# Patient Record
Sex: MALE | Race: WHITE | NOT HISPANIC OR LATINO | URBAN - METROPOLITAN AREA
[De-identification: names, ages, dates, MRNs, and addresses within clinical notes are randomized per-mention and may not be internally consistent; named-entity substitution may affect disease eponyms.]

---

## 2017-04-12 NOTE — PATIENT DISCUSSION
Recommended Avastin (prophy) injection. The injection was given and tolerated well by patient. Post-injection instructions were reviewed and understood by the patient.

## 2017-04-12 NOTE — PROCEDURE NOTE: CLINICAL
PROCEDURE NOTE: Avastin () (prophy) OS. Diagnosis: Neovascular AMD with Inactive CNV. Prep: Betadine Flush. Prior to the original injection, risks/benefits/alternatives discussed including infection, loss of vision, hemorrhage, cataract, glaucoma, retinal tears or detachment. A written consent is on file, and the need for today’s injection was discussed and the patient is understanding and wishes to proceed. The off-label status of Intravitreal Avastin also was reviewed. The patient wished to proceed with treatment. The patient wished to proceed with treatment. Topical anesthetic drops were applied to the eye. Betadine prep was performed. Surgical mask worn. Sterile drape and lid speculum were applied. Using the syringe provided, Avastin 1.25 mg in 0.05 cc was injected into the vitreous cavity. Injection site: 3-4 mm from the limbus. Patient tolerated procedure well. Following the intravitreal injection, the sterile lid speculum was removed. CRA perfusion confirmed. CF vision checked. Patient given office phone number/answering service number and advised to call immediately should there be an increase in floaters or redness, loss of vision or pain, or should they have any other questions or concerns. Tammie Mariano

## 2017-06-05 NOTE — PATIENT DISCUSSION
Advised to call immediately if any worsening distortion or blurring is noted. Continue with Macular Shield AREDS 2.

## 2017-06-05 NOTE — PATIENT DISCUSSION
No treatment at this time due to no chance of increase VA potential due to damage from AMD. Observation recommended.

## 2017-06-05 NOTE — PATIENT DISCUSSION
Pt edu no change from last visit. [facility] Prophy Avastin # 12 in 1 month due to high tendency to return. Pt elects to proceed.

## 2017-07-05 NOTE — PATIENT DISCUSSION
Pt edu no change from last visit. [facility] Prophy Avastin # 12 (prophy) due to high tendency to return. Pt elects to proceed.

## 2017-07-05 NOTE — PROCEDURE NOTE: CLINICAL
PROCEDURE NOTE: Avastin () (prophy) OS. Diagnosis: Neovascular AMD with Active CNV. Prep: Betadine Flush. Prior to the original injection, risks/benefits/alternatives discussed including infection, loss of vision, hemorrhage, cataract, glaucoma, retinal tears or detachment. A written consent is on file, and the need for today’s injection was discussed and the patient is understanding and wishes to proceed. The off-label status of Intravitreal Avastin also was reviewed. The patient wished to proceed with treatment. The patient wished to proceed with treatment. Topical anesthetic drops were applied to the eye. Betadine prep was performed. Surgical mask worn. Sterile drape and lid speculum were applied. Using the syringe provided, Avastin 1.25 mg in 0.05 cc was injected into the vitreous cavity. Injection site: 3-4 mm from the limbus. Patient tolerated procedure well. Following the intravitreal injection, the sterile lid speculum was removed. CRA perfusion confirmed. CF vision checked. Patient given office phone number/answering service number and advised to call immediately should there be an increase in floaters or redness, loss of vision or pain, or should they have any other questions or concerns. Alana Hassan

## 2017-08-28 NOTE — PATIENT DISCUSSION
Pt edu new blood, but no treatment recc due to no increase in [place] potential. Will watch for now.

## 2017-08-28 NOTE — PATIENT DISCUSSION
Pt edu no change from last visit. [facility] Prophy Avastin # 13 (prophy) due to high tendency to return. Pt elects to proceed.

## 2017-09-27 NOTE — PROCEDURE NOTE: CLINICAL
PROCEDURE NOTE: Avastin () Prophy #13 OS. Diagnosis: Neovascular AMD with Inactive CNV. Risks and benefits of the procedure were explained in detail to the patient including loss of vision, loss of eye, infection, bleeding, retinal detachment, glaucoma, need for additional procedures, and cataract. Additionally. The patient was informed that AVASTIN is not approved for intraocular use and is being administered off label. Alternatives including other AntiNegativeFG agents were discussed with the patient. The patient was given the opportunity to ask any questions, and address any concerns. Patient understood the risks and benefits, and signed written consent for surgery. Tetravisc Given *. Topical anesthesia was induced with Alcaine. 4% lidocaine pledge. Betadine prep was performed. Product is within expiration date, and has been verified. An unopened 30 g needle was securely affixed to the 1 cc syringe. Excess drug and air bubbles were carefully expressed such that the plunger aligned with the .05 mL viviana on the syringe. A lid speculum was used. After the Betadine prep, intravitreal injection of Avastin 1.25mg/0.05 ml was given. Injection site: 3-4 mm from the limbus. The needle was withdrawn; retinal perfusion was verified. Lid speculum removed. The eye was irrigated with sterile irrigating solution. The betadine was washed away. Patient tolerated procedure well. Count fingers vision was verified. There were no complications. Patient given office phone number/answering service phone number. Post-injection instructions were reviewed and understood. Symptoms of RD and endophthalmitis following intravitreal injection were discussed. Patient advised to call right away if any loss of vision, new floaters, or eye pain. Post-op instructions given. Patient was given the standard instruction sheet. Appropriate follow-up was arranged. Derrick Adame

## 2017-09-27 NOTE — PATIENT DISCUSSION
Recommended injection of Avastin # 13 as prophy. The injection was given and tolerated well by patient. Post-injection instructions were reviewed and understood by the patient.

## 2017-10-27 NOTE — PATIENT DISCUSSION
Fluid still present on exam today. No injection recommended. No visual potential increase due to advanced stage AMD/permanent pigment damage.

## 2017-11-01 NOTE — PROCEDURE NOTE: CLINICAL
PROCEDURE NOTE: Avastin ()(1 of 2) #14 OS. Diagnosis: Neovascular AMD with Inactive CNV. Prior to the original injection, risks/benefits/alternatives discussed including infection, loss of vision, hemorrhage, cataract, glaucoma, retinal tears or detachment. A written consent is on file, and the need for today’s injection was discussed and the patient is understanding and wishes to proceed. The off-label status of Intravitreal Avastin also was reviewed. The patient wished to proceed with treatment. The patient wished to proceed with treatment. Topical anesthetic drops were applied to the eye. Betadine prep was performed. Surgical mask worn. Sterile drape and lid speculum were applied. Using the syringe provided, Avastin 1.25 mg in 0.05 cc was injected into the vitreous cavity. Injection site: 3-4 mm from the limbus. Patient tolerated procedure well. Following the intravitreal injection, the sterile lid speculum was removed. CRA perfusion confirmed. CF vision checked. Patient given office phone number/answering service number and advised to call immediately should there be an increase in floaters or redness, loss of vision or pain, or should they have any other questions or concerns. Miriam Cortes

## 2017-11-29 NOTE — PATIENT DISCUSSION
Injection was administered without complication.  Post-injection instructions reviewed and understood by patient.

## 2017-11-29 NOTE — PROCEDURE NOTE: CLINICAL
PROCEDURE NOTE: Avastin () (2 of 2) #15 OS. Diagnosis: Neovascular AMD with Active CNV. Prep: Betadine Drops and Betadine Scrub. Prior to the original injection, risks/benefits/alternatives discussed including infection, loss of vision, hemorrhage, cataract, glaucoma, retinal tears or detachment. A written consent is on file, and the need for today’s injection was discussed and the patient is understanding and wishes to proceed. The off-label status of Intravitreal Avastin also was reviewed. The patient wished to proceed with treatment. The patient wished to proceed with treatment. Topical anesthetic drops were applied to the eye. Betadine prep was performed. Surgical mask worn. Sterile drape and lid speculum were applied. Using the syringe provided, Avastin 1.25 mg in 0.05 cc was injected into the vitreous cavity. Injection site: 3-4 mm from the limbus. Patient tolerated procedure well. Following the intravitreal injection, the sterile lid speculum was removed. CRA perfusion confirmed. CF vision checked. Patient given office phone number/answering service number and advised to call immediately should there be an increase in floaters or redness, loss of vision or pain, or should they have any other questions or concerns. Jay Roland

## 2018-01-11 NOTE — PATIENT DISCUSSION
No fluid today. 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 Explained to patient. Pt is not driving.

## 2018-01-11 NOTE — PATIENT DISCUSSION
Based on the OCT findings, I recommend a “treat and extend” protocol, and we will extend the injection interval to 7 weeks.

## 2018-01-24 NOTE — PROCEDURE NOTE: CLINICAL
PROCEDURE NOTE: Avastin () #16 OS. Diagnosis: Neovascular AMD with Active CNV. Prep: Betadine Drops and Betadine Scrub. Prior to the original injection, risks/benefits/alternatives discussed including infection, loss of vision, hemorrhage, cataract, glaucoma, retinal tears or detachment. A written consent is on file, and the need for today’s injection was discussed and the patient is understanding and wishes to proceed. The off-label status of Intravitreal Avastin also was reviewed. The patient wished to proceed with treatment. The patient wished to proceed with treatment. Topical anesthetic drops were applied to the eye. Betadine prep was performed. Surgical mask worn. Sterile drape and lid speculum were applied. Using the syringe provided, Avastin 1.25 mg in 0.05 cc was injected into the vitreous cavity. Injection site: 3-4 mm from the limbus. Patient tolerated procedure well. Following the intravitreal injection, the sterile lid speculum was removed. CRA perfusion confirmed. CF vision checked. Patient given office phone number/answering service number and advised to call immediately should there be an increase in floaters or redness, loss of vision or pain, or should they have any other questions or concerns. Ori Abarca

## 2018-03-16 NOTE — PATIENT DISCUSSION
Pt edu inactive now. Due to high tendency to return and chronic condition, recommend Avastin # 17 in 2 weeks. Advised pt to call with any vision changes.

## 2018-03-29 NOTE — PROCEDURE NOTE: CLINICAL
PROCEDURE NOTE: Avastin () #17 OS. Diagnosis: Neovascular AMD with Inactive CNV. Prior to the original injection, risks/benefits/alternatives discussed including infection, loss of vision, hemorrhage, cataract, glaucoma, retinal tears or detachment. A written consent is on file, and the need for today’s injection was discussed and the patient is understanding and wishes to proceed. The off-label status of Intravitreal Avastin also was reviewed. The patient wished to proceed with treatment. The patient wished to proceed with treatment. Topical anesthetic drops were applied to the eye. Betadine prep was performed. Surgical mask worn. Sterile drape and lid speculum were applied. Using the syringe provided, Avastin 1.25 mg in 0.05 cc was injected into the vitreous cavity. Injection site: 3-4 mm from the limbus. Patient tolerated procedure well. Following the intravitreal injection, the sterile lid speculum was removed. CRA perfusion confirmed. CF vision checked. Patient given office phone number/answering service number and advised to call immediately should there be an increase in floaters or redness, loss of vision or pain, or should they have any other questions or concerns. Esther Stephens

## 2018-05-30 NOTE — PROCEDURE NOTE: CLINICAL
PROCEDURE NOTE: Avastin () (1 of 2) #18 OS. Diagnosis: Neovascular AMD with Inactive CNV. Prep: Betadine Drops and Betadine Scrub. Prior to the original injection, risks/benefits/alternatives discussed including infection, loss of vision, hemorrhage, cataract, glaucoma, retinal tears or detachment. A written consent is on file, and the need for today’s injection was discussed and the patient is understanding and wishes to proceed. The off-label status of Intravitreal Avastin also was reviewed. The patient wished to proceed with treatment. The patient wished to proceed with treatment. Topical anesthetic drops were applied to the eye. Betadine prep was performed. Surgical mask worn. Sterile drape and lid speculum were applied. Using the syringe provided, Avastin 1.25 mg in 0.05 cc was injected into the vitreous cavity. Injection site: 3-4 mm from the limbus. Patient tolerated procedure well. Following the intravitreal injection, the sterile lid speculum was removed. CRA perfusion confirmed. CF vision checked. Patient given office phone number/answering service number and advised to call immediately should there be an increase in floaters or redness, loss of vision or pain, or should they have any other questions or concerns. Rema Land

## 2018-05-30 NOTE — PATIENT DISCUSSION
Pt edu still inactive but due to high tendency to return and only good seeing eye Recommend Avastin # 18 today. Pt elects to proceed. Goal is to treat and extend. Reason for decreased vision is due to atrophy which unfortunately is not treatable.

## 2018-05-30 NOTE — PATIENT DISCUSSION
Recommended injection of Avastin # 18 (1 of 2). The injection was given and tolerated well by patient. Post-injection instructions were reviewed and understood by the patient.

## 2018-07-25 NOTE — PATIENT DISCUSSION
Pt here today for Avastin # 19 (2 of 2). The injection was given and tolerated well by patient. Post-injection instructions were reviewed and understood by the patient.

## 2018-07-25 NOTE — PROCEDURE NOTE: CLINICAL
PROCEDURE NOTE: Avastin () ( 2 of 2) #19 OS. Diagnosis: Neovascular AMD with Inactive CNV. Prep: Betadine Drops and Betadine Scrub. Prior to the original injection, risks/benefits/alternatives discussed including infection, loss of vision, hemorrhage, cataract, glaucoma, retinal tears or detachment. A written consent is on file, and the need for today’s injection was discussed and the patient is understanding and wishes to proceed. The off-label status of Intravitreal Avastin also was reviewed. The patient wished to proceed with treatment. The patient wished to proceed with treatment. Topical anesthetic drops were applied to the eye. Betadine prep was performed. Surgical mask worn. Sterile drape and lid speculum were applied. Using the syringe provided, Avastin 1.25 mg in 0.05 cc was injected into the vitreous cavity. Injection site: 3-4 mm from the limbus. Patient tolerated procedure well. Following the intravitreal injection, the sterile lid speculum was removed. CRA perfusion confirmed. CF vision checked. Patient given office phone number/answering service number and advised to call immediately should there be an increase in floaters or redness, loss of vision or pain, or should they have any other questions or concerns. Carlos Schaefer

## 2018-10-10 NOTE — PATIENT DISCUSSION
Pt here today for Avastin #20 injection.  The injection was administered without complication.  Post-injection instructions were reviewed and understood by pt.

## 2018-10-10 NOTE — PROCEDURE NOTE: CLINICAL
PROCEDURE NOTE: Avastin () #20 OS. Diagnosis: Neovascular AMD with Inactive CNV. Prep: Betadine Drops and Betadine Scrub. Prior to the original injection, risks/benefits/alternatives discussed including infection, loss of vision, hemorrhage, cataract, glaucoma, retinal tears or detachment. A written consent is on file, and the need for today’s injection was discussed and the patient is understanding and wishes to proceed. The off-label status of Intravitreal Avastin also was reviewed. The patient wished to proceed with treatment. The patient wished to proceed with treatment. Topical anesthetic drops were applied to the eye. Betadine prep was performed. Surgical mask worn. Sterile drape and lid speculum were applied. Using the syringe provided, Avastin 1.25 mg in 0.05 cc was injected into the vitreous cavity. Injection site: 3-4 mm from the limbus. Patient tolerated procedure well. Following the intravitreal injection, the sterile lid speculum was removed. CRA perfusion confirmed. CF vision checked. Patient given office phone number/answering service number and advised to call immediately should there be an increase in floaters or redness, loss of vision or pain, or should they have any other questions or concerns. Carlos Schaefer
100% of the time

## 2018-11-16 NOTE — PATIENT DISCUSSION
Pt edu reason for decreased VA is due to atrophy which unfortunately is not treatable. Pt edu still inactive. Due to high tendency to return and only good seeing eye, would recommend Avastin #21 in 4 weeks. Pt elects to proceed.

## 2019-01-02 NOTE — PROCEDURE NOTE: CLINICAL
PROCEDURE NOTE: Avastin () #21 OS. Diagnosis: Neovascular AMD with Inactive CNV. Prep: Betadine Drops and Betadine Scrub. Prior to the original injection, risks/benefits/alternatives discussed including infection, loss of vision, hemorrhage, cataract, glaucoma, retinal tears or detachment. A written consent is on file, and the need for today’s injection was discussed and the patient is understanding and wishes to proceed. The off-label status of Intravitreal Avastin also was reviewed. The patient wished to proceed with treatment. The patient wished to proceed with treatment. Topical anesthetic drops were applied to the eye. Betadine prep was performed. Surgical mask worn. Sterile drape and lid speculum were applied. Using the syringe provided, Avastin 1.25 mg in 0.05 cc was injected into the vitreous cavity. Injection site: 3-4 mm from the limbus. Patient tolerated procedure well. Following the intravitreal injection, the sterile lid speculum was removed. CRA perfusion confirmed. CF vision checked. Patient given office phone number/answering service number and advised to call immediately should there be an increase in floaters or redness, loss of vision or pain, or should they have any other questions or concerns. Elaine Leong

## 2019-03-15 NOTE — PATIENT DISCUSSION
Pt edu no treatment recommend at this time.  No increase in vision potential due to damage from advanced AMD.

## 2019-03-15 NOTE — PATIENT DISCUSSION
Pt edu remaining stable. Due to high tendency to return and only good seeing eye would recommend Avastin # 22 in 3 weeks. Pt elects to proceed.

## 2019-04-03 NOTE — PROCEDURE NOTE: CLINICAL
PROCEDURE NOTE: Avastin () #22 OS. Diagnosis: Neovascular AMD with Inactive CNV. Prep: Betadine Drops and Betadine Scrub. Prior to the original injection, risks/benefits/alternatives discussed including infection, loss of vision, hemorrhage, cataract, glaucoma, retinal tears or detachment. A written consent is on file, and the need for today’s injection was discussed and the patient is understanding and wishes to proceed. The off-label status of Intravitreal Avastin also was reviewed. The patient wished to proceed with treatment. The patient wished to proceed with treatment. Topical anesthetic drops were applied to the eye. Betadine prep was performed. Surgical mask worn. Sterile drape and lid speculum were applied. Using the syringe provided, Avastin 1.25 mg in 0.05 cc was injected into the vitreous cavity. Injection site: 3-4 mm from the limbus. Patient tolerated procedure well. Following the intravitreal injection, the sterile lid speculum was removed. CRA perfusion confirmed. CF vision checked. Patient given office phone number/answering service number and advised to call immediately should there be an increase in floaters or redness, loss of vision or pain, or should they have any other questions or concerns. Rosmery Granados

## 2019-06-21 NOTE — PATIENT DISCUSSION
New Periph hem seen and IR fluid, advanced with discaform scar and central atrophy so no visual increase potential.

## 2019-06-21 NOTE — PATIENT DISCUSSION
Goal of tx edu, rec series of 2 Avastin x 8 weeks apart.  High tendency for activity to return and this is a chronic condition.

## 2019-07-03 NOTE — PATIENT DISCUSSION
Pt here for Avastin #23(1 of 2) today.  Post inj instructions. Due to decreased [place] OCT was performed today which showed an increase in swelling. Recommend decreasing interval between injections. Recommend 2 of 2 in 4 weeks. Pt elects to proceed.

## 2019-07-03 NOTE — PROCEDURE NOTE: CLINICAL
PROCEDURE NOTE: Avastin () ( 1 of 2) #23 OS. Diagnosis: Neovascular AMD with Inactive CNV. Prep: Betadine Drops and Betadine Scrub. Prior to the original injection, risks/benefits/alternatives discussed including infection, loss of vision, hemorrhage, cataract, glaucoma, retinal tears or detachment. A written consent is on file, and the need for today’s injection was discussed and the patient is understanding and wishes to proceed. The off-label status of Intravitreal Avastin also was reviewed. The patient wished to proceed with treatment. The patient wished to proceed with treatment. Topical anesthetic drops were applied to the eye. Betadine prep was performed. Surgical mask worn. Sterile drape and lid speculum were applied. Using the syringe provided, Avastin 1.25 mg in 0.05 cc was injected into the vitreous cavity. Injection site: 3-4 mm from the limbus. Patient tolerated procedure well. Following the intravitreal injection, the sterile lid speculum was removed. CRA perfusion confirmed. CF vision checked. Patient given office phone number/answering service number and advised to call immediately should there be an increase in floaters or redness, loss of vision or pain, or should they have any other questions or concerns. Rosmery Granados

## 2019-07-31 NOTE — PROCEDURE NOTE: CLINICAL
PROCEDURE NOTE: Avastin () (2 of 2) #24 OS. Diagnosis: Neovascular AMD with Inactive CNV. Prep: Betadine Drops and Betadine Scrub. Prior to the original injection, risks/benefits/alternatives discussed including infection, loss of vision, hemorrhage, cataract, glaucoma, retinal tears or detachment. A written consent is on file, and the need for today’s injection was discussed and the patient is understanding and wishes to proceed. The off-label status of Intravitreal Avastin also was reviewed. The patient wished to proceed with treatment. The patient wished to proceed with treatment. Topical anesthetic drops were applied to the eye. Betadine prep was performed. Surgical mask worn. Sterile drape and lid speculum were applied. Using the syringe provided, Avastin 1.25 mg in 0.05 cc was injected into the vitreous cavity. Injection site: 3-4 mm from the limbus. Patient tolerated procedure well. Following the intravitreal injection, the sterile lid speculum was removed. CRA perfusion confirmed. CF vision checked. Patient given office phone number/answering service number and advised to call immediately should there be an increase in floaters or redness, loss of vision or pain, or should they have any other questions or concerns. GFT#[id number] exp 09/12/2019.

## 2019-09-06 NOTE — PATIENT DISCUSSION
No fluid seen today.  Recommended  switch to Eylea to extend treatments.  Eylea #1 SAMPLE, then OCT in 5 weeks.  Will fill out Eylea 4U form next visit.

## 2019-09-06 NOTE — PATIENT DISCUSSION
New Periph hem seen and IR fluid, advanced with disciform scar and central atrophy so no visual increase potential.

## 2019-09-11 NOTE — PROCEDURE NOTE: CLINICAL
PROCEDURE NOTE: Eylea Sample #1 OS. Diagnosis: Neovascular AMD with Inactive CNV. Anesthesia: Topical. Prep: Betadine Drops and Betadine Scrub. Prior to injection, risks/benefits/alternatives discussed including corneal abrasion, infection, loss of vision, hemorrhage, cataract, glaucoma, retinal tears or detachment. A written consent is on file, and the need for today’s injection was discussed and the patient is understanding and wishes to proceed. The entire vial of Eylea was drawn up into a syringe. The opened vial, remaining drug, and filtered needle were disposed of in a certified biohazard container. Betadine prep was performed. Topical anesthesia was induced with Alcaine. 4% lidocaine pledge. A lid speculum was used. A short 30g needle on a 1cc syringe was used with product that that had previously been prepared under sterile conditions. Injection site: 3-4 mm from the limbus. The used syringe/needle was transferred to a biohazard container. Lid speculum removed. Mask worn during procedure. Patient tolerated procedure well. Count fingers vision was verified. There were no complications. Patient was given the standard instruction sheet. Patient given office phone number/answering service number and advised to call immediately should there be loss of vision or pain, or should they have any other questions or concerns. [facility]#[id number] exp 12/2019.

## 2019-10-25 NOTE — PATIENT DISCUSSION
Good response to Eylea.  Recommended Eylea in x 2 weeks.  Series of 2 x 6 weeks apart.   Will have pt sign up for Eylea 4U.

## 2019-10-25 NOTE — PATIENT DISCUSSION
Stable Periph heme and IR fluid, advanced with disciform scar and central atrophy so no visual increase potential.

## 2019-10-25 NOTE — PATIENT DISCUSSION
Good reponds to Eylea.  Recommended Eylea in x 2 weeks.  Series of 2 x 6 weeks apart.   Will have pt sign up for Eylea 4U.

## 2019-11-13 NOTE — PATIENT DISCUSSION
Pt here today for Eylea #2 (1 of 2) injection.  The injection was administered without complication. Post-injection instructions were reviewed and understood by the patient.

## 2019-11-13 NOTE — PROCEDURE NOTE: CLINICAL
PROCEDURE NOTE: Eylea 2mg (1 of 2) #2 OS. Diagnosis: Neovascular AMD with Active CNV. Prep: Betadine Drops and Betadine Scrub. Prior to injection, risks/benefits/alternatives discussed including corneal abrasion, infection, loss of vision, hemorrhage, cataract, glaucoma, retinal tears or detachment. A written consent is on file, and the need for today’s injection was discussed and the patient is understanding and wishes to proceed. The entire vial of Eylea was drawn up into a syringe. The opened vial, remaining drug, and filtered needle were disposed of in a certified biohazard container. Betadine prep was performed. Topical anesthesia was induced with Alcaine. 4% lidocaine pledge. A lid speculum was used. A short 30g needle on a 1cc syringe was used with product that that had previously been prepared under sterile conditions. Injection site: 3-4 mm from the limbus. The used syringe/needle was transferred to a biohazard container. Lid speculum removed. Mask worn during procedure. Patient tolerated procedure well. Count fingers vision was verified. There were no complications. Patient was given the standard instruction sheet. Patient given office phone number/answering service number and advised to call immediately should there be loss of vision or pain, or should they have any other questions or concerns. Lot #[id number], exp 9/30/20.

## 2019-12-11 NOTE — PATIENT DISCUSSION
Pt here today for Eylea #3 (2 of 2) injection.  The injection was administered without complication. Post-injection instructions were reviewed and understood by the patient.

## 2019-12-11 NOTE — PATIENT DISCUSSION
Vision decreased today on exam, order OCT to better image fundus.  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  Pt will need to use Ecc. Fix to maximized VA.

## 2019-12-11 NOTE — PROCEDURE NOTE: CLINICAL
PROCEDURE NOTE: Eylea 2mg (2 of 2) #3 OS. Diagnosis: Neovascular AMD with Active CNV. Prep: Betadine Drops and Betadine Scrub. Prior to injection, risks/benefits/alternatives discussed including corneal abrasion, infection, loss of vision, hemorrhage, cataract, glaucoma, retinal tears or detachment. A written consent is on file, and the need for today’s injection was discussed and the patient is understanding and wishes to proceed. The entire vial of Eylea was drawn up into a syringe. The opened vial, remaining drug, and filtered needle were disposed of in a certified biohazard container. Betadine prep was performed. Topical anesthesia was induced with Alcaine. 4% lidocaine pledge. A lid speculum was used. A short 30g needle on a 1cc syringe was used with product that that had previously been prepared under sterile conditions. Injection site: 3-4 mm from the limbus. The used syringe/needle was transferred to a biohazard container. Lid speculum removed. Mask worn during procedure. Patient tolerated procedure well. Count fingers vision was verified. There were no complications. Patient was given the standard instruction sheet. Patient given office phone number/answering service number and advised to call immediately should there be loss of vision or pain, or should they have any other questions or concerns. Jaren Wu

## 2020-01-10 NOTE — PATIENT DISCUSSION
As discussed last visit increased Spenser atrophy and unfortunately is not treatable.  Pt will need to use Ecc. Fix to maximized VA.  No active fluid or blood.  Rec cont. tx's, high tendency for activity to return.  Pt may benefit from new E-Sight glasses and will contact pt to trial. Flashes from [place] syndrome. Fluctuating vision common with AMD dut to blind spots/atrophic areas from AMD.  Goal of tx at this point is to keep what vision pt has and hold off new activity.  Rec series of 2 Eylea x.

## 2020-02-05 NOTE — PROCEDURE NOTE: CLINICAL
PROCEDURE NOTE: Eylea 2mg (1 of 2) #4 OS. Diagnosis: Neovascular AMD with Active CNV. Prep: Betadine Drops and Betadine Scrub. Prior to injection, risks/benefits/alternatives discussed including corneal abrasion, infection, loss of vision, hemorrhage, cataract, glaucoma, retinal tears or detachment. A written consent is on file, and the need for today’s injection was discussed and the patient is understanding and wishes to proceed. The entire vial of Eylea was drawn up into a syringe. The opened vial, remaining drug, and filtered needle were disposed of in a certified biohazard container. Betadine prep was performed. Topical anesthesia was induced with Alcaine. 4% lidocaine pledge. A lid speculum was used. A short 30g needle on a 1cc syringe was used with product that that had previously been prepared under sterile conditions. Injection site: 3-4 mm from the limbus. The used syringe/needle was transferred to a biohazard container. Lid speculum removed. Mask worn during procedure. Patient tolerated procedure well. Count fingers vision was verified. There were no complications. Patient was given the standard instruction sheet. Patient given office phone number/answering service number and advised to call immediately should there be loss of vision or pain, or should they have any other questions or concerns. Shari Amaya

## 2020-02-05 NOTE — PATIENT DISCUSSION
Eylea #4 (1 of 2) injection recommended today after discussion of benefits, risks, alternatives. The injection was administered without complication. Post-injection instructions were reviewed and understood by the patient.

## 2020-03-27 NOTE — PATIENT DISCUSSION
Pt here today for Eylea #5 (2 of 2) injection.  The injection was administered without complication. Post-injection instructions were reviewed and understood by the patient.

## 2020-03-27 NOTE — PROCEDURE NOTE: CLINICAL
PROCEDURE NOTE: Eylea Prefilled Syringe 2mg (2 of 2) #5 OS. Diagnosis: Neovascular AMD with Active CNV. Prior to injection, risks/benefits/alternatives discussed including corneal abrasion, infection, loss of vision, hemorrhage, cataract, glaucoma, retinal tears or detachment. A written consent is on file, and the need for today's injection was discussed and the patient is understanding and wishes to proceed. A 30G needle was placed on an Eylea 2mg/0.05ml Pre-filled Syringe. Betadine prep was performed. Topical anesthesia was induced with Alcaine. 4% lidocaine pledge. A lid speculum was used. An intravitreal injection of Eylea was given. Injection site: 3-4 mm from the limbus. The used syringe/needle was transferred to a biohazard container. Lid speculum removed. Mask worn during procedure. Patient tolerated procedure well. Count fingers vision was verified. There were no complications. Patient was given the standard instruction sheet. Lot# [id number], exp 8/31/2020.

## 2020-05-15 NOTE — PATIENT DISCUSSION
Pt edu stable, Decision to treat was made based on today's clinical findings. Recommend a series of 2 Eylea, 6 weeks apart. Pt elects to proceed.

## 2020-05-15 NOTE — PROCEDURE NOTE: CLINICAL
PROCEDURE NOTE: Eylea Prefilled Syringe 2mg (1 of 2) #6 OS. Diagnosis: Neovascular AMD with Active CNV. Prep: Betadine Drops and Betadine Scrub. Prior to injection, risks/benefits/alternatives discussed including corneal abrasion, infection, loss of vision, hemorrhage, cataract, glaucoma, retinal tears or detachment. A written consent is on file, and the need for today's injection was discussed and the patient is understanding and wishes to proceed. A 30G needle was placed on an Eylea 2mg/0.05ml Pre-filled Syringe. Betadine prep was performed. Topical anesthesia was induced with Alcaine. 4% lidocaine pledge. A lid speculum was used. An intravitreal injection of Eylea was given. Injection site: 3-4 mm from the limbus. The used syringe/needle was transferred to a biohazard container. Lid speculum removed. Mask worn during procedure. Patient tolerated procedure well. Count fingers vision was verified. There were no complications. Patient was given the standard instruction sheet. Minh Sellers

## 2020-05-15 NOTE — PATIENT DISCUSSION
Pt here today for Eylea #6 (1 of 2) injection.  The injection was administered without complication. Post-injection instructions were reviewed and understood by the patient.

## 2020-06-26 NOTE — PATIENT DISCUSSION
Pt here today for Eylea #7(2 of 2) injection.  The injection was administered without complication. Post-injection instructions were reviewed and understood by the patient.

## 2020-06-26 NOTE — PROCEDURE NOTE: CLINICAL
PROCEDURE NOTE: Eylea Prefilled Syringe 2mg (2 of 2) #7 OS. Diagnosis: Neovascular AMD with Active CNV. Prep: Betadine Drops and Betadine Scrub. Prior to injection, risks/benefits/alternatives discussed including corneal abrasion, infection, loss of vision, hemorrhage, cataract, glaucoma, retinal tears or detachment. A written consent is on file, and the need for today's injection was discussed and the patient is understanding and wishes to proceed. A 30G needle was placed on an Eylea 2mg/0.05ml Pre-filled Syringe. Betadine prep was performed. Topical anesthesia was induced with Alcaine. 4% lidocaine pledge. A lid speculum was used. An intravitreal injection of Eylea was given. Injection site: 3-4 mm from the limbus. The used syringe/needle was transferred to a biohazard container. Lid speculum removed. Mask worn during procedure. Patient tolerated procedure well. Count fingers vision was verified. There were no complications. Patient was given the standard instruction sheet. Derrick Adame

## 2020-08-06 NOTE — PROCEDURE NOTE: CLINICAL
PROCEDURE NOTE: Eylea Prefilled Syringe 2mg(1 of 2) #8 OS. Diagnosis: Neovascular AMD with Active CNV. Prior to injection, risks/benefits/alternatives discussed including corneal abrasion, infection, loss of vision, hemorrhage, cataract, glaucoma, retinal tears or detachment. A written consent is on file, and the need for today's injection was discussed and the patient is understanding and wishes to proceed. A 30G needle was placed on an Eylea 2mg/0.05ml Pre-filled Syringe. Betadine prep was performed. Topical anesthesia was induced with Alcaine. 4% lidocaine pledge. A lid speculum was used. An intravitreal injection of Eylea was given. Injection site: 3-4 mm from the limbus. The used syringe/needle was transferred to a biohazard container. Lid speculum removed. Mask worn during procedure. Patient tolerated procedure well. Count fingers vision was verified. There were no complications. Patient was given the standard instruction sheet. Marivel Khan

## 2020-08-06 NOTE — PATIENT DISCUSSION
Still no fluid but high tendency for activity to return.  Rec series of Eylea x 6 weeks apart.  Goal edu, Eylea #8(1 of 2) injection today.  The injection was administered without complication. Post-injection instructions were reviewed and understood by the patient.

## 2020-09-16 NOTE — PATIENT DISCUSSION
Pt here for Eylea #9 (2 of 2) injection today.  The injection was administered without complication. Post-injection instructions were reviewed and understood by the patient.

## 2020-09-16 NOTE — PROCEDURE NOTE: CLINICAL
PROCEDURE NOTE: Eylea Prefilled Syringe 2mg (2 of 2) #9 OS. Diagnosis: Neovascular AMD with Active CNV. Prep: Betadine Drops and Betadine Scrub. Prior to injection, risks/benefits/alternatives discussed including corneal abrasion, infection, loss of vision, hemorrhage, cataract, glaucoma, retinal tears or detachment. A written consent is on file, and the need for today's injection was discussed and the patient is understanding and wishes to proceed. A 30G needle was placed on an Eylea 2mg/0.05ml Pre-filled Syringe. Betadine prep was performed. Topical anesthesia was induced with Alcaine. 4% lidocaine pledge. A lid speculum was used. An intravitreal injection of Eylea was given. Injection site: 3-4 mm from the limbus. The used syringe/needle was transferred to a biohazard container. Lid speculum removed. Mask worn during procedure. Patient tolerated procedure well. Count fingers vision was verified. There were no complications. Patient was given the standard instruction sheet. lot #[id number], exp 3/31/2021.

## 2020-10-30 NOTE — PROCEDURE NOTE: CLINICAL
PROCEDURE NOTE: Eylea 2mg(1 of 2) #10 OS. Diagnosis: Neovascular AMD with Active CNV. Prep: Betadine Drops and Betadine Scrub. Prior to injection, risks/benefits/alternatives discussed including corneal abrasion, infection, loss of vision, hemorrhage, cataract, glaucoma, retinal tears or detachment. A written consent is on file, and the need for today’s injection was discussed and the patient is understanding and wishes to proceed. The entire vial of Eylea was drawn up into a syringe. The opened vial, remaining drug, and filtered needle were disposed of in a certified biohazard container. Betadine prep was performed. Topical anesthesia was induced with Alcaine. 4% lidocaine pledge. A lid speculum was used. A short 30g needle on a 1cc syringe was used with product that that had previously been prepared under sterile conditions. Injection site: 3-4 mm from the limbus. The used syringe/needle was transferred to a biohazard container. Lid speculum removed. Mask worn during procedure. Patient tolerated procedure well. Count fingers vision was verified. There were no complications. Patient was given the standard instruction sheet. Patient given office phone number/answering service number and advised to call immediately should there be loss of vision or pain, or should they have any other questions or concerns. lot #[id number], exp 06/30/2021.

## 2020-10-30 NOTE — PATIENT DISCUSSION
Pt will be traveling to [facility] in December after next appt and plans to return in Feb 2021.  Info given for [facility], Dr. Sánchez Gtz, ph [phone], fax [phone], to schedule appt while there. Will forward records after next treatment appt.

## 2020-10-30 NOTE — PATIENT DISCUSSION
Pt edu no fluid seen today on OCT or exam.  Recommended Eylea #10 injection today.  Pt elects to proceed.  The injection was administered without complication. Post-injection instructions were reviewed and understood by the patient.  Will continue treatments 6 weeks apart.  Series of 2 x 6 weeks apart.

## 2020-11-10 NOTE — PATIENT DISCUSSION
Artificial Tears: One drop to both eyes 3-4 times daily. We recommend Systane or Refresh lubricating eye drops which can be found at any pharmacy. Continue with Mac Shield AREDS 2.

## 2020-12-09 NOTE — PROCEDURE NOTE: CLINICAL
PROCEDURE NOTE: Eylea 2mg (2 of 2) #11 OS. Diagnosis: Neovascular AMD with Active CNV. Prep: Betadine Drops and Betadine Scrub. Prior to injection, risks/benefits/alternatives discussed including corneal abrasion, infection, loss of vision, hemorrhage, cataract, glaucoma, retinal tears or detachment. A written consent is on file, and the need for today’s injection was discussed and the patient is understanding and wishes to proceed. The entire vial of Eylea was drawn up into a syringe. The opened vial, remaining drug, and filtered needle were disposed of in a certified biohazard container. Betadine prep was performed. Topical anesthesia was induced with Alcaine. 4% lidocaine pledge. A lid speculum was used. A short 30g needle on a 1cc syringe was used with product that that had previously been prepared under sterile conditions. Injection site: 3-4 mm from the limbus. The used syringe/needle was transferred to a biohazard container. Lid speculum removed. Mask worn during procedure. Patient tolerated procedure well. Count fingers vision was verified. There were no complications. Patient was given the standard instruction sheet. Patient given office phone number/answering service number and advised to call immediately should there be loss of vision or pain, or should they have any other questions or concerns. Carlos Schaefer

## 2020-12-09 NOTE — PATIENT DISCUSSION
Pt will be traveling to [facility] in December after next appt and plans to return in Feb 2021.  Info given for [facility], Dr. Michelle Love, ph [phone], fax [phone], to schedule appt while there. Will forward records after next treatment appt.

## 2020-12-09 NOTE — PATIENT DISCUSSION
Recommended Eylea #11 (2 OF 2) injection today.  Pt elects to proceed.  The injection was administered without complication. Post-injection instructions were reviewed and understood by the patient.  Will continue treatments 6 weeks apart.

## 2021-03-26 NOTE — PATIENT DISCUSSION
Recommended Eylea #13 (1 OF 2) injection today.  Pt elects to proceed.  The injection was administered without complication. Post-injection instructions were reviewed and understood by the patient.  Will continue treatments 6 weeks apart.

## 2021-03-26 NOTE — PROCEDURE NOTE: CLINICAL
PROCEDURE NOTE: Eylea 2mg (1 of 2) #12 OS. Diagnosis: Neovascular AMD with Active CNV. Prep: Betadine Drops and Betadine Scrub. Prior to injection, risks/benefits/alternatives discussed including corneal abrasion, infection, loss of vision, hemorrhage, cataract, glaucoma, retinal tears or detachment. A written consent is on file, and the need for today’s injection was discussed and the patient is understanding and wishes to proceed. The entire vial of Eylea was drawn up into a syringe. The opened vial, remaining drug, and filtered needle were disposed of in a certified biohazard container. Betadine prep was performed. Topical anesthesia was induced with Alcaine. 4% lidocaine pledge. A lid speculum was used. A short 30g needle on a 1cc syringe was used with product that that had previously been prepared under sterile conditions. Injection site: 3-4 mm from the limbus. The used syringe/needle was transferred to a biohazard container. Lid speculum removed. Mask worn during procedure. Patient tolerated procedure well. Count fingers vision was verified. There were no complications. Patient was given the standard instruction sheet. Patient given office phone number/answering service number and advised to call immediately should there be loss of vision or pain, or should they have any other questions or concerns. Stan Cortez

## 2021-05-12 NOTE — PROCEDURE NOTE: CLINICAL
PROCEDURE NOTE: Eylea  2mg (2 of 2) #13 OS. Diagnosis: Neovascular AMD with Active CNV. Prep: Betadine Drops and Betadine Scrub. Prior to injection, risks/benefits/alternatives discussed including corneal abrasion, infection, loss of vision, hemorrhage, cataract, glaucoma, retinal tears or detachment. A written consent is on file, and the need for today’s injection was discussed and the patient is understanding and wishes to proceed. The entire vial of Eylea was drawn up into a syringe. The opened vial, remaining drug, and filtered needle were disposed of in a certified biohazard container. Betadine prep was performed. Topical anesthesia was induced with Alcaine. 4% lidocaine pledge. A lid speculum was used. A short 30g needle on a 1cc syringe was used with product that that had previously been prepared under sterile conditions. Injection site: 3-4 mm from the limbus. The used syringe/needle was transferred to a biohazard container. Lid speculum removed. Mask worn during procedure. Patient tolerated procedure well. Count fingers vision was verified. There were no complications. Patient was given the standard instruction sheet. Patient given office phone number/answering service number and advised to call immediately should there be loss of vision or pain, or should they have any other questions or concerns. Abraham Thomas

## 2021-07-02 NOTE — PATIENT DISCUSSION
Recommended Eylea #15 injection today.  Pt elects to proceed.  The injection was administered without complication. Post-injection instructions were reviewed and understood by the patient.  Series of 2 x 7 weeks apart.

## 2021-07-02 NOTE — PROCEDURE NOTE: CLINICAL
PROCEDURE NOTE: Eylea 2mg (1 of 2) #14 OS. Diagnosis: Neovascular AMD with Active CNV. Prep: Betadine Drops and Betadine Scrub. Prior to injection, risks/benefits/alternatives discussed including corneal abrasion, infection, loss of vision, hemorrhage, cataract, glaucoma, retinal tears or detachment. A written consent is on file, and the need for today’s injection was discussed and the patient is understanding and wishes to proceed. The entire vial of Eylea was drawn up into a syringe. The opened vial, remaining drug, and filtered needle were disposed of in a certified biohazard container. Betadine prep was performed. Topical anesthesia was induced with Alcaine. 4% lidocaine pledge. A lid speculum was used. A short 30g needle on a 1cc syringe was used with product that that had previously been prepared under sterile conditions. Injection site: 3-4 mm from the limbus. The used syringe/needle was transferred to a biohazard container. Lid speculum removed. Mask worn during procedure. Patient tolerated procedure well. Count fingers vision was verified. There were no complications. Patient was given the standard instruction sheet. Patient given office phone number/answering service number and advised to call immediately should there be loss of vision or pain, or should they have any other questions or concerns. lot #[id number], exp 9/30/21.

## 2021-08-25 NOTE — PROCEDURE NOTE: CLINICAL
PROCEDURE NOTE: Eylea 2mg (2 of 2) #15 OS. Diagnosis: Neovascular AMD with Active CNV. Prep: Betadine Drops and Betadine Scrub. Prior to injection, risks/benefits/alternatives discussed including corneal abrasion, infection, loss of vision, hemorrhage, cataract, glaucoma, retinal tears or detachment. A written consent is on file, and the need for today’s injection was discussed and the patient is understanding and wishes to proceed. The entire vial of Eylea was drawn up into a syringe. The opened vial, remaining drug, and filtered needle were disposed of in a certified biohazard container. Betadine prep was performed. Topical anesthesia was induced with Alcaine. 4% lidocaine pledge. A lid speculum was used. A short 30g needle on a 1cc syringe was used with product that that had previously been prepared under sterile conditions. Injection site: 3-4 mm from the limbus. The used syringe/needle was transferred to a biohazard container. Lid speculum removed. Mask worn during procedure. Patient tolerated procedure well. Count fingers vision was verified. There were no complications. Patient was given the standard instruction sheet. Patient given office phone number/answering service number and advised to call immediately should there be loss of vision or pain, or should they have any other questions or concerns. Martín Gtz

## 2021-08-25 NOTE — PATIENT DISCUSSION
Recommended Eylea #15(2 of 2) injection today.  Pt elects to proceed.  The injection was administered without complication. Post-injection instructions were reviewed and understood by the patient.

## 2021-10-01 NOTE — PATIENT DISCUSSION
Eylea #16 (1 of 2) injection recommended today.  Pt elects to proceed.  The injection was administered without complication. Post-injection instructions were reviewed and understood by the patient. series of 2 x 7 weeks apart.

## 2021-10-01 NOTE — PROCEDURE NOTE: CLINICAL
PROCEDURE NOTE: Eylea 2mg(1 of 2) #16 OS. Diagnosis: Neovascular AMD with Active CNV. Prep: Betadine Drops and Betadine Scrub. Prior to injection, risks/benefits/alternatives discussed including corneal abrasion, infection, loss of vision, hemorrhage, cataract, glaucoma, retinal tears or detachment. A written consent is on file, and the need for today’s injection was discussed and the patient is understanding and wishes to proceed. The entire vial of Eylea was drawn up into a syringe. The opened vial, remaining drug, and filtered needle were disposed of in a certified biohazard container. Betadine prep was performed. Topical anesthesia was induced with Alcaine. 4% lidocaine pledge. A lid speculum was used. A short 30g needle on a 1cc syringe was used with product that that had previously been prepared under sterile conditions. Injection site: 3-4 mm from the limbus. The used syringe/needle was transferred to a biohazard container. Lid speculum removed. Mask worn during procedure. Patient tolerated procedure well. Count fingers vision was verified. There were no complications. Patient was given the standard instruction sheet. Patient given office phone number/answering service number and advised to call immediately should there be loss of vision or pain, or should they have any other questions or concerns. Ori Abarca

## 2021-11-17 NOTE — PATIENT DISCUSSION
Patient here for Lina #17 (2 of 2) injection today.  Pt elects to proceed.  The injection was administered without complication. Post-injection instructions were reviewed and understood by the patient.  patient to return in 7 weeks.

## 2021-11-17 NOTE — PROCEDURE NOTE: CLINICAL
PROCEDURE NOTE: Eylea 2mg (2 of 2) #17 OS. Diagnosis: Neovascular AMD with Active CNV. Prep: Betadine Drops and Betadine Scrub. Prior to injection, risks/benefits/alternatives discussed including corneal abrasion, infection, loss of vision, hemorrhage, cataract, glaucoma, retinal tears or detachment. A written consent is on file, and the need for today’s injection was discussed and the patient is understanding and wishes to proceed. The entire vial of Eylea was drawn up into a syringe. The opened vial, remaining drug, and filtered needle were disposed of in a certified biohazard container. Betadine prep was performed. Topical anesthesia was induced with Alcaine. 4% lidocaine pledge. A lid speculum was used. A short 30g needle on a 1cc syringe was used with product that that had previously been prepared under sterile conditions. Injection site: 3-4 mm from the limbus. The used syringe/needle was transferred to a biohazard container. Lid speculum removed. Mask worn during procedure. Patient tolerated procedure well. Count fingers vision was verified. There were no complications. Patient was given the standard instruction sheet. Patient given office phone number/answering service number and advised to call immediately should there be loss of vision or pain, or should they have any other questions or concerns. Krysta Izquierdo

## 2021-11-29 ENCOUNTER — ESTABLISHED COMPREHENSIVE EXAM (OUTPATIENT)
Dept: URBAN - METROPOLITAN AREA CLINIC 10 | Facility: CLINIC | Age: 71
End: 2021-11-29

## 2021-11-29 DIAGNOSIS — H52.13: ICD-10-CM

## 2021-11-29 DIAGNOSIS — H40.1111: ICD-10-CM

## 2021-11-29 PROCEDURE — 92015 DETERMINE REFRACTIVE STATE: CPT

## 2021-11-29 PROCEDURE — 92250 FUNDUS PHOTOGRAPHY W/I&R: CPT

## 2021-11-29 PROCEDURE — 92014 COMPRE OPH EXAM EST PT 1/>: CPT

## 2021-11-29 ASSESSMENT — KERATOMETRY
OD_AXISANGLE_DEGREES: 145
OS_K2POWER_DIOPTERS: 40.50
OS_AXISANGLE2_DEGREES: 130
OS_K1POWER_DIOPTERS: 40.00
OD_K1POWER_DIOPTERS: 38.75
OD_K2POWER_DIOPTERS: 40.00
OD_AXISANGLE2_DEGREES: 55
OS_AXISANGLE_DEGREES: 040

## 2021-11-29 ASSESSMENT — TONOMETRY
OS_IOP_MMHG: 15
OD_IOP_MMHG: 18

## 2021-11-29 ASSESSMENT — VISUAL ACUITY
OU_SC: 20/40
OD_SC: 20/80-1

## 2022-01-12 NOTE — PATIENT DISCUSSION
Recommend Eylea #18 (1 of 2) injection today.  Pt elects to proceed.  The injection was administered without complication. Post-injection instructions were reviewed and understood by the patient. Series of 2 x 7 weeks apart.

## 2022-01-12 NOTE — PROCEDURE NOTE: CLINICAL
PROCEDURE NOTE: Eylea 2mg(1 of 2) #18 OS. Diagnosis: Neovascular AMD with Active CNV. Prep: Betadine Drops and Betadine Scrub. Prior to injection, risks/benefits/alternatives discussed including corneal abrasion, infection, loss of vision, hemorrhage, cataract, glaucoma, retinal tears or detachment. A written consent is on file, and the need for today’s injection was discussed and the patient is understanding and wishes to proceed. The entire vial of Eylea was drawn up into a syringe. The opened vial, remaining drug, and filtered needle were disposed of in a certified biohazard container. Betadine prep was performed. Topical anesthesia was induced with Alcaine. 4% lidocaine pledge. A lid speculum was used. A short 30g needle on a 1cc syringe was used with product that that had previously been prepared under sterile conditions. Injection site: 3-4 mm from the limbus. The used syringe/needle was transferred to a biohazard container. Lid speculum removed. Mask worn during procedure. Patient tolerated procedure well. Count fingers vision was verified. There were no complications. Patient was given the standard instruction sheet. Patient given office phone number/answering service number and advised to call immediately should there be loss of vision or pain, or should they have any other questions or concerns. Esther Stephens

## 2022-03-02 NOTE — PROCEDURE NOTE: CLINICAL
PROCEDURE NOTE: Eylea 2mg (2 of 2) #19 OS. Diagnosis: Neovascular AMD with Active CNV. Prep: Betadine Drops and Betadine Scrub. Prior to injection, risks/benefits/alternatives discussed including corneal abrasion, infection, loss of vision, hemorrhage, cataract, glaucoma, retinal tears or detachment. A written consent is on file, and the need for today’s injection was discussed and the patient is understanding and wishes to proceed. The entire vial of Eylea was drawn up into a syringe. The opened vial, remaining drug, and filtered needle were disposed of in a certified biohazard container. Betadine prep was performed. Topical anesthesia was induced with Alcaine. 4% lidocaine pledge. A lid speculum was used. A short 30g needle on a 1cc syringe was used with product that that had previously been prepared under sterile conditions. Injection site: 3-4 mm from the limbus. The used syringe/needle was transferred to a biohazard container. Lid speculum removed. Mask worn during procedure. Patient tolerated procedure well. Count fingers vision was verified. There were no complications. Patient was given the standard instruction sheet. Patient given office phone number/answering service number and advised to call immediately should there be loss of vision or pain, or should they have any other questions or concerns. Carlos Schaefer

## 2022-03-02 NOTE — PATIENT DISCUSSION
Recommend Eylea #19 (2 of 2) injection today.  Pt elects to proceed.  The injection was administered without complication. Post-injection instructions were reviewed and understood by the patient. Series of 2 x 7 weeks apart.

## 2022-04-22 NOTE — PATIENT DISCUSSION
Stable Periph heme and IR fluid, advanced with disciform scar and central atrophy so no visual increase potential and no tx recommended.

## 2022-04-22 NOTE — PROCEDURE NOTE: CLINICAL
PROCEDURE NOTE: Eylea Prefilled Syringe 2mg (1 of 2) #20 OS. Diagnosis: Neovascular AMD with Active CNV. Prep: Betadine Drops and Betadine Scrub. Prior to injection, risks/benefits/alternatives discussed including corneal abrasion, infection, loss of vision, hemorrhage, cataract, glaucoma, retinal tears or detachment. A written consent is on file, and the need for today's injection was discussed and the patient is understanding and wishes to proceed. A 30G needle was placed on an Eylea 2mg/0.05ml Pre-filled Syringe. Betadine prep was performed. Topical anesthesia was induced with Alcaine. 4% lidocaine pledge. A lid speculum was used. An intravitreal injection of Eylea was given. Injection site: 3-4 mm from the limbus. The used syringe/needle was transferred to a biohazard container. Lid speculum removed. Mask worn during procedure. Patient tolerated procedure well. Count fingers vision was verified. There were no complications. Patient was given the standard instruction sheet. Burt Plata

## 2022-04-22 NOTE — PATIENT DISCUSSION
Recommend Eylea #20 (1 of 2) injection today.  Pt elects to proceed.  The injection was administered without complication. Post-injection instructions were reviewed and understood by the patient. Series of 2 x 7 weeks apart.

## 2022-04-29 NOTE — PATIENT DISCUSSION
No retinal detachment or retinal tear noted. complaints of dizziness and nausea Dose of baclofen scheduled, should I administer Fingerstick 473, 549 on repeat Pt due for PO medication but on continuous BiPAP Refusing bed alarm and chair alarm hypoglycemia Patient hyperglycemic 570 and 536 repeat FS.

## 2022-05-13 NOTE — PATIENT DISCUSSION
Patient will return next week for 1 of 2 Avastin injections, 4 weeks later for 2 of 2. detailed exam

## 2022-06-08 NOTE — PATIENT DISCUSSION
Recommend Eylea #21 (1 of 2) injection today.  Pt elects to proceed.  The injection was administered without complication. Post-injection instructions were reviewed and understood by the patient. Series of 2 x 5-7 weeks apart. Will decrease intervals due to decrease in [place] today. 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 Gave patient Refresh Gel to use QID to see if that helps improve VA. Will work around patients schedule to visit family. Would like to see pt in 5 weeks but she is traveling and may not be back for 7 weeks.

## 2022-06-08 NOTE — PROCEDURE NOTE: CLINICAL
PROCEDURE NOTE: Eylea Prefilled Syringe 2mg(2 of 2) #21 OS. Diagnosis: Neovascular AMD with Active CNV. Prep: Betadine Drops and Betadine Scrub. Prior to injection, risks/benefits/alternatives discussed including corneal abrasion, infection, loss of vision, hemorrhage, cataract, glaucoma, retinal tears or detachment. A written consent is on file, and the need for today's injection was discussed and the patient is understanding and wishes to proceed. A 30G needle was placed on an Eylea 2mg/0.05ml Pre-filled Syringe. Betadine prep was performed. Topical anesthesia was induced with Alcaine. 4% lidocaine pledge. A lid speculum was used. An intravitreal injection of Eylea was given. Injection site: 3-4 mm from the limbus. The used syringe/needle was transferred to a biohazard container. Lid speculum removed. Mask worn during procedure. Patient tolerated procedure well. Count fingers vision was verified. There were no complications. Patient was given the standard instruction sheet. Marissa Corral

## 2022-06-20 RX ORDER — NEBIVOLOL 10 MG/1
TABLET ORAL
COMMUNITY
End: 2022-08-22 | Stop reason: ALTCHOICE

## 2022-06-20 RX ORDER — LEVOTHYROXINE SODIUM 0.12 MG/1
TABLET ORAL
COMMUNITY
End: 2022-08-22 | Stop reason: SDUPTHER

## 2022-06-20 RX ORDER — ATORVASTATIN CALCIUM 20 MG/1
TABLET, FILM COATED ORAL
COMMUNITY
End: 2022-08-22 | Stop reason: SDUPTHER

## 2022-06-20 RX ORDER — ALLOPURINOL 300 MG/1
TABLET ORAL
COMMUNITY
End: 2022-08-22 | Stop reason: SDUPTHER

## 2022-06-20 RX ORDER — AMLODIPINE BESYLATE 10 MG/1
TABLET ORAL
COMMUNITY
End: 2022-08-22 | Stop reason: SDUPTHER

## 2022-06-20 RX ORDER — VALSARTAN AND HYDROCHLOROTHIAZIDE 320; 25 MG/1; MG/1
TABLET, FILM COATED ORAL
COMMUNITY
End: 2022-08-22 | Stop reason: SDUPTHER

## 2022-06-20 RX ORDER — CARVEDILOL PHOSPHATE 20 MG/1
CAPSULE, EXTENDED RELEASE ORAL
COMMUNITY
Start: 2022-02-17 | End: 2022-08-22 | Stop reason: SDUPTHER

## 2022-06-20 RX ORDER — POTASSIUM CHLORIDE 20 MEQ/1
TABLET, EXTENDED RELEASE ORAL
COMMUNITY
End: 2022-08-22 | Stop reason: SDUPTHER

## 2022-06-20 ASSESSMENT — KERATOMETRY
OS_AXISANGLE2_DEGREES: 130
OS_AXISANGLE_DEGREES: 040
OD_AXISANGLE2_DEGREES: 55
OD_K2POWER_DIOPTERS: 40.00
OS_K2POWER_DIOPTERS: 40.50
OD_AXISANGLE_DEGREES: 145
OD_K1POWER_DIOPTERS: 38.75
OS_K1POWER_DIOPTERS: 40.00

## 2022-06-24 ENCOUNTER — DIAGNOSTICS ONLY (OUTPATIENT)
Dept: URBAN - METROPOLITAN AREA CLINIC 10 | Facility: CLINIC | Age: 72
End: 2022-06-24

## 2022-06-24 DIAGNOSIS — H40.1111: ICD-10-CM

## 2022-06-24 PROCEDURE — 92133 CPTRZD OPH DX IMG PST SGM ON: CPT

## 2022-06-24 PROCEDURE — 92083 EXTENDED VISUAL FIELD XM: CPT

## 2022-06-24 PROCEDURE — 92012 INTRM OPH EXAM EST PATIENT: CPT

## 2022-06-24 ASSESSMENT — VISUAL ACUITY
OD_CC: 20/60-2
OS_CC: 20/20-1
OU_CC: 20/20-1

## 2022-06-24 ASSESSMENT — TONOMETRY
OD_IOP_MMHG: 18
OS_IOP_MMHG: 16

## 2022-07-14 NOTE — PROCEDURE NOTE: CLINICAL

## 2022-07-14 NOTE — PATIENT DISCUSSION
Recommend Eylea injection today.  Treat and extend injection interval to 7 weeks (stable retina and patient travel).

## 2022-08-10 PROBLEM — R29.898 BILATERAL LEG WEAKNESS: Status: ACTIVE | Noted: 2022-08-10

## 2022-08-10 NOTE — PATIENT DISCUSSION
Pt here for Eylea #14 (2 OF 2) injection today.  Pt elects to proceed.  The injection was administered without complication. Post-injection instructions were reviewed and understood by the patient. 25

## 2022-08-22 PROBLEM — I10 ESSENTIAL HYPERTENSION: Status: ACTIVE | Noted: 2022-08-22

## 2022-08-22 PROBLEM — M10.9 GOUT: Status: ACTIVE | Noted: 2022-08-22

## 2022-08-22 PROBLEM — E03.9 HYPOTHYROIDISM: Status: ACTIVE | Noted: 2022-08-22

## 2022-08-22 PROBLEM — E11.9 TYPE 2 DIABETES MELLITUS WITHOUT COMPLICATION (HCC): Status: ACTIVE | Noted: 2022-08-22

## 2022-08-22 PROBLEM — E11.65 TYPE 2 DIABETES MELLITUS WITH HYPERGLYCEMIA, WITHOUT LONG-TERM CURRENT USE OF INSULIN (HCC): Status: ACTIVE | Noted: 2022-08-22

## 2022-08-22 PROBLEM — E66.9 OBESITY: Status: ACTIVE | Noted: 2022-08-22

## 2022-08-22 PROBLEM — E11.9 TYPE 2 DIABETES MELLITUS WITHOUT COMPLICATION (HCC): Status: RESOLVED | Noted: 2022-08-22 | Resolved: 2022-08-22

## 2022-08-22 PROBLEM — I87.2 PERIPHERAL VENOUS INSUFFICIENCY: Status: ACTIVE | Noted: 2022-08-22

## 2022-08-22 PROBLEM — N40.2 PROSTATE NODULE: Status: ACTIVE | Noted: 2022-08-22

## 2022-08-22 PROBLEM — C73 MALIGNANT TUMOR OF THYROID GLAND (HCC): Status: ACTIVE | Noted: 2022-08-22

## 2022-08-22 PROBLEM — M48.061 LUMBAR SPINAL STENOSIS: Status: ACTIVE | Noted: 2022-08-22

## 2022-08-22 PROBLEM — E78.2 MIXED HYPERLIPIDEMIA: Status: ACTIVE | Noted: 2022-08-22

## 2022-09-01 NOTE — PROCEDURE NOTE: CLINICAL
PROCEDURE NOTE: Eylea Prefilled Syringe 2mg OS. Diagnosis: Macular Drusen. Prep: Betadine Drops and Betadine Scrub. Prior to injection, risks/benefits/alternatives discussed including corneal abrasion, infection, loss of vision, hemorrhage, cataract, glaucoma, retinal tears or detachment. A written consent is on file, and the need for today's injection was discussed and the patient is understanding and wishes to proceed. A 30G needle was placed on an Eylea 2mg/0.05ml Pre-filled Syringe. Betadine prep was performed. Topical anesthesia was induced with Alcaine. 4% lidocaine pledge. A lid speculum was used. An intravitreal injection of Eylea was given. Injection site: 3-4 mm from the limbus. The used syringe/needle was transferred to a biohazard container. Lid speculum removed. Mask worn during procedure. Patient tolerated procedure well. Count fingers vision was verified. There were no complications. Patient was given the standard instruction sheet. Rema Land

## 2022-10-09 NOTE — PATIENT DISCUSSION
No significant change in glasses Rx.  Recomment Dr Ronit Ring for low vision exam. within normal limits

## 2022-12-07 ENCOUNTER — ESTABLISHED PATIENT (OUTPATIENT)
Dept: URBAN - METROPOLITAN AREA CLINIC 10 | Facility: CLINIC | Age: 72
End: 2022-12-07

## 2022-12-07 PROCEDURE — 92250 FUNDUS PHOTOGRAPHY W/I&R: CPT

## 2022-12-07 PROCEDURE — 92014 COMPRE OPH EXAM EST PT 1/>: CPT

## 2022-12-07 PROCEDURE — 92015 DETERMINE REFRACTIVE STATE: CPT

## 2022-12-07 ASSESSMENT — KERATOMETRY
OS_AXISANGLE_DEGREES: 50
OD_AXISANGLE2_DEGREES: 60
OS_K2POWER_DIOPTERS: 40.75
OD_AXISANGLE_DEGREES: 150
OD_K2POWER_DIOPTERS: 39.75
OS_AXISANGLE2_DEGREES: 140
OS_K1POWER_DIOPTERS: 40.00
OD_K1POWER_DIOPTERS: 39.00

## 2022-12-07 ASSESSMENT — VISUAL ACUITY
OU_CC: 20/20-2
OS_CC: 20/25+1
OD_CC: 20/25+2

## 2022-12-07 ASSESSMENT — TONOMETRY
OD_IOP_MMHG: 18
OS_IOP_MMHG: 16

## 2023-01-17 NOTE — PROCEDURE NOTE: CLINICAL
PROCEDURE NOTE: Eylea Prefilled Syringe 2mg OS. Diagnosis: Neovascular AMD with Active CNV. Anesthesia: Topical. Prep: Betadine Drops and Betadine Scrub. The patient was identified by the physician. The risks, benefits, alternatives, and possible complications of the procedure were discussed with the patient and informed consent was obtained. The procedure eye was marked with a sticker. The patient was positioned in the chair. A sterile small gauge needle on the medication syringe entered the vitreous cavity 3.0-3.5mm from the limbus and the medication was administered without complication. The speculum was removed. The eye was irrigated with sterile eye rinse solution. The patient was instructed to call immediately for any visual loss, swelling, discharge, or pain after the intravitreal injection. Patient tolerated the procedure well. Vision was checked. Post procedure instructions given. Esther Stephens

## 2023-01-31 NOTE — PROCEDURE NOTE: CLINICAL
PROCEDURE NOTE: Biopsy of Eyelid Left Lower Lid. Diagnosis: Eyelid Lesion, Uncertain Behavior. Anesthesia: Local. Risks, benefits and alternatives discussed. Patient desires to proceed with biopsy today. A drop of proparacaine was instilled in the involved eye. Involved area was prepped using alcohol wipe and anesthetized using 1% lidocaine with epi. Lesion was excised using mini Thomas scissors and forceps and placed in formalin to be sent for histopathology. Wound was cauterized to achieve hemostasis and erythromycin ophthalmic ointment was applied. Patient tolerated procedure well. There were no complications. Post procedure instructions given. Jac Burgos

## 2023-06-13 ASSESSMENT — KERATOMETRY
OD_AXISANGLE2_DEGREES: 60
OS_K1POWER_DIOPTERS: 40.00
OS_AXISANGLE2_DEGREES: 140
OD_K2POWER_DIOPTERS: 39.75
OS_K2POWER_DIOPTERS: 40.75
OS_AXISANGLE_DEGREES: 50
OD_AXISANGLE_DEGREES: 150
OD_K1POWER_DIOPTERS: 39.00

## 2023-06-14 ENCOUNTER — DIAGNOSTICS ONLY (OUTPATIENT)
Dept: URBAN - METROPOLITAN AREA CLINIC 10 | Facility: CLINIC | Age: 73
End: 2023-06-14

## 2023-06-14 DIAGNOSIS — H40.1111: ICD-10-CM

## 2023-06-14 PROCEDURE — 92083 EXTENDED VISUAL FIELD XM: CPT

## 2023-06-14 PROCEDURE — 99213 OFFICE O/P EST LOW 20 MIN: CPT

## 2023-06-14 PROCEDURE — 92133 CPTRZD OPH DX IMG PST SGM ON: CPT

## 2023-06-14 ASSESSMENT — TONOMETRY
OS_IOP_MMHG: 18
OD_IOP_MMHG: 20

## 2023-06-14 ASSESSMENT — VISUAL ACUITY
OU_CC: 20/25-1
OD_CC: 20/40-3
OS_CC: 20/25-1

## 2023-07-05 ENCOUNTER — FOLLOW UP (OUTPATIENT)
Dept: URBAN - METROPOLITAN AREA CLINIC 10 | Facility: CLINIC | Age: 73
End: 2023-07-05

## 2023-07-05 DIAGNOSIS — H26.491: ICD-10-CM

## 2023-07-05 PROCEDURE — 99213 OFFICE O/P EST LOW 20 MIN: CPT

## 2023-07-05 ASSESSMENT — KERATOMETRY
OS_K1POWER_DIOPTERS: 40.00
OS_K2POWER_DIOPTERS: 40.75
OD_AXISANGLE_DEGREES: 145
OD_AXISANGLE2_DEGREES: 55
OD_K1POWER_DIOPTERS: 39.00
OD_K2POWER_DIOPTERS: 39.75
OD_AXISANGLE2_DEGREES: 60
OS_K1POWER_DIOPTERS: 40.25
OS_AXISANGLE_DEGREES: 50
OS_AXISANGLE2_DEGREES: 140
OD_AXISANGLE_DEGREES: 150
OD_K2POWER_DIOPTERS: 40.00

## 2023-07-05 ASSESSMENT — VISUAL ACUITY
OU_CC: 20/20-2
OD_CC: 20/50-1
OS_CC: 20/20-2

## 2023-07-11 ENCOUNTER — ESTABLISHED PATIENT (OUTPATIENT)
Dept: URBAN - METROPOLITAN AREA CLINIC 9 | Facility: CLINIC | Age: 73
End: 2023-07-11

## 2023-07-11 DIAGNOSIS — Z96.1: ICD-10-CM

## 2023-07-11 PROCEDURE — 99214 OFFICE O/P EST MOD 30 MIN: CPT

## 2023-07-11 ASSESSMENT — VISUAL ACUITY
OD_GLARE: 20/400
OS_BCVA: 20/30
OS_CC: 20/30
OD_CC: 20/60
OD_BCVA: 20/60
OS_GLARE: 20/70

## 2023-07-11 ASSESSMENT — KERATOMETRY
OD_K1POWER_DIOPTERS: 39.00
OS_K1POWER_DIOPTERS: 40.00
OD_AXISANGLE_DEGREES: 150
OD_K2POWER_DIOPTERS: 39.75
OS_AXISANGLE2_DEGREES: 140
OS_K2POWER_DIOPTERS: 40.75
OD_AXISANGLE2_DEGREES: 60
OD_K2POWER_DIOPTERS: 40.00
OS_K1POWER_DIOPTERS: 40.25
OD_AXISANGLE_DEGREES: 145
OS_AXISANGLE_DEGREES: 50
OD_AXISANGLE2_DEGREES: 55

## 2023-07-11 ASSESSMENT — TONOMETRY
OS_IOP_MMHG: 21
OD_IOP_MMHG: 28
OD_IOP_MMHG: 26
OS_IOP_MMHG: 18

## 2023-07-15 ASSESSMENT — KERATOMETRY
OS_K1POWER_DIOPTERS: 40.00
OD_AXISANGLE2_DEGREES: 55
OS_K2POWER_DIOPTERS: 40.75
OD_AXISANGLE2_DEGREES: 60
OD_K1POWER_DIOPTERS: 39.00
OD_K2POWER_DIOPTERS: 39.75
OD_AXISANGLE_DEGREES: 150
OS_AXISANGLE2_DEGREES: 140
OS_AXISANGLE_DEGREES: 50
OD_K2POWER_DIOPTERS: 40.00
OD_AXISANGLE_DEGREES: 145
OS_K1POWER_DIOPTERS: 40.25

## 2023-07-17 ENCOUNTER — PREPPED CHART (OUTPATIENT)
Dept: URBAN - METROPOLITAN AREA CLINIC 10 | Facility: CLINIC | Age: 73
End: 2023-07-17

## 2023-07-17 ENCOUNTER — FOLLOW UP (OUTPATIENT)
Dept: URBAN - METROPOLITAN AREA CLINIC 10 | Facility: CLINIC | Age: 73
End: 2023-07-17

## 2023-07-17 DIAGNOSIS — Z96.1: ICD-10-CM

## 2023-07-17 DIAGNOSIS — H52.13: ICD-10-CM

## 2023-07-17 DIAGNOSIS — H40.1111: ICD-10-CM

## 2023-07-17 PROCEDURE — 99213 OFFICE O/P EST LOW 20 MIN: CPT

## 2023-07-17 ASSESSMENT — KERATOMETRY
OD_K2POWER_DIOPTERS: 39.75
OD_AXISANGLE_DEGREES: 150
OS_AXISANGLE2_DEGREES: 140
OD_K2POWER_DIOPTERS: 40.00
OS_K2POWER_DIOPTERS: 40.75
OS_K1POWER_DIOPTERS: 40.00
OD_AXISANGLE_DEGREES: 145
OS_K1POWER_DIOPTERS: 40.25
OS_AXISANGLE_DEGREES: 50
OD_AXISANGLE2_DEGREES: 55
OD_K1POWER_DIOPTERS: 39.00
OD_AXISANGLE2_DEGREES: 60

## 2023-07-17 ASSESSMENT — VISUAL ACUITY
OD_PH: 20/40
OS_CC: 20/20-2
OU_CC: 20/20-2
OD_CC: 20/40
OU_CC: 20/20-2
OS_CC: 20/20-2
OD_CC: 20/40

## 2023-07-17 ASSESSMENT — TONOMETRY: OD_IOP_MMHG: 26

## 2023-12-13 ENCOUNTER — ESTABLISHED PATIENT (OUTPATIENT)
Dept: URBAN - METROPOLITAN AREA CLINIC 10 | Facility: CLINIC | Age: 73
End: 2023-12-13

## 2023-12-13 DIAGNOSIS — Z98.890: ICD-10-CM

## 2023-12-13 DIAGNOSIS — Z96.1: ICD-10-CM

## 2023-12-13 DIAGNOSIS — H40.1111: ICD-10-CM

## 2023-12-13 DIAGNOSIS — H31.091: ICD-10-CM

## 2023-12-13 DIAGNOSIS — H43.393: ICD-10-CM

## 2023-12-13 DIAGNOSIS — H52.13: ICD-10-CM

## 2023-12-13 PROCEDURE — 92014 COMPRE OPH EXAM EST PT 1/>: CPT

## 2023-12-13 PROCEDURE — 92250 FUNDUS PHOTOGRAPHY W/I&R: CPT

## 2023-12-13 PROCEDURE — 92015 DETERMINE REFRACTIVE STATE: CPT

## 2023-12-13 ASSESSMENT — TONOMETRY
OD_IOP_MMHG: 14
OS_IOP_MMHG: 16

## 2023-12-13 ASSESSMENT — KERATOMETRY
OS_K2POWER_DIOPTERS: 40.75
OD_K2POWER_DIOPTERS: 40.75
OD_AXISANGLE_DEGREES: 170
OD_K1POWER_DIOPTERS: 39.00
OS_AXISANGLE2_DEGREES: 135
OS_K1POWER_DIOPTERS: 40.25
OS_AXISANGLE_DEGREES: 45
OD_AXISANGLE2_DEGREES: 80

## 2023-12-13 ASSESSMENT — VISUAL ACUITY
OU_CC: 20/20-2
OS_CC: 20/25-1

## 2024-03-22 ENCOUNTER — DIAGNOSTICS ONLY (OUTPATIENT)
Dept: URBAN - METROPOLITAN AREA CLINIC 10 | Facility: CLINIC | Age: 74
End: 2024-03-22

## 2024-03-22 DIAGNOSIS — H53.131: ICD-10-CM

## 2024-03-22 DIAGNOSIS — H40.1111: ICD-10-CM

## 2024-03-22 PROCEDURE — 92083 EXTENDED VISUAL FIELD XM: CPT

## 2024-03-22 PROCEDURE — 92133 CPTRZD OPH DX IMG PST SGM ON: CPT

## 2024-03-22 PROCEDURE — 99214 OFFICE O/P EST MOD 30 MIN: CPT

## 2024-03-22 ASSESSMENT — VISUAL ACUITY
OS_CC: 20/25
OU_CC: 20/25

## 2024-03-22 ASSESSMENT — TONOMETRY
OD_IOP_MMHG: 12
OS_IOP_MMHG: 15

## 2024-04-29 ENCOUNTER — ESTABLISHED PATIENT (OUTPATIENT)
Dept: URBAN - METROPOLITAN AREA CLINIC 14 | Facility: CLINIC | Age: 74
End: 2024-04-29

## 2024-04-29 DIAGNOSIS — H40.1121: ICD-10-CM

## 2024-04-29 DIAGNOSIS — H43.813: ICD-10-CM

## 2024-04-29 DIAGNOSIS — H27.131: ICD-10-CM

## 2024-04-29 DIAGNOSIS — H33.011: ICD-10-CM

## 2024-04-29 DIAGNOSIS — H40.1113: ICD-10-CM

## 2024-04-29 PROCEDURE — 76519 ECHO EXAM OF EYE: CPT

## 2024-04-29 PROCEDURE — 99214 OFFICE O/P EST MOD 30 MIN: CPT

## 2024-04-29 PROCEDURE — 92134 CPTRZ OPH DX IMG PST SGM RTA: CPT

## 2024-04-29 ASSESSMENT — VISUAL ACUITY
OS_CC: 20/30-1
OD_PH: 20/400
OD_CC: CF 1FT

## 2024-04-29 ASSESSMENT — TONOMETRY
OS_IOP_MMHG: 17
OD_IOP_MMHG: 16

## 2024-06-04 ENCOUNTER — SURGERY/PROCEDURE (OUTPATIENT)
Dept: URBAN - METROPOLITAN AREA SURGERY 6 | Facility: SURGERY | Age: 74
End: 2024-06-04

## 2024-06-04 DIAGNOSIS — H27.131: ICD-10-CM

## 2024-06-04 DIAGNOSIS — H33.011: ICD-10-CM

## 2024-06-04 PROCEDURE — 67036 REMOVAL OF INNER EYE FLUID: CPT

## 2024-06-04 PROCEDURE — 66986 EXCHANGE LENS PROSTHESIS: CPT | Mod: 59,RT

## 2024-06-05 ENCOUNTER — POST-OP (OUTPATIENT)
Facility: LOCATION | Age: 74
End: 2024-06-05

## 2024-06-05 DIAGNOSIS — H27.131: ICD-10-CM

## 2024-06-05 PROCEDURE — 99024 POSTOP FOLLOW-UP VISIT: CPT

## 2024-06-05 ASSESSMENT — TONOMETRY
OD_IOP_MMHG: 12
OS_IOP_MMHG: 10

## 2024-06-05 ASSESSMENT — VISUAL ACUITY: OS_CC: 20/20-1

## 2024-06-19 ENCOUNTER — POST-OP (OUTPATIENT)
Facility: LOCATION | Age: 74
End: 2024-06-19

## 2024-06-19 DIAGNOSIS — H33.011: ICD-10-CM

## 2024-06-19 DIAGNOSIS — H27.131: ICD-10-CM

## 2024-06-19 PROCEDURE — 99024 POSTOP FOLLOW-UP VISIT: CPT

## 2024-06-19 PROCEDURE — 92134 CPTRZ OPH DX IMG PST SGM RTA: CPT

## 2024-06-19 ASSESSMENT — TONOMETRY
OS_IOP_MMHG: 19
OD_IOP_MMHG: 24

## 2024-06-19 ASSESSMENT — VISUAL ACUITY
OS_CC: 20/30
OD_SC: 20/80

## 2024-07-03 ENCOUNTER — POST-OP (OUTPATIENT)
Facility: LOCATION | Age: 74
End: 2024-07-03

## 2024-07-03 DIAGNOSIS — H33.011: ICD-10-CM

## 2024-07-03 DIAGNOSIS — H27.131: ICD-10-CM

## 2024-07-03 PROCEDURE — 99024 POSTOP FOLLOW-UP VISIT: CPT

## 2024-07-03 ASSESSMENT — VISUAL ACUITY
OD_CC: 20/60
OS_CC: 20/30
OD_PH: 20/50-1

## 2024-07-03 ASSESSMENT — TONOMETRY
OD_IOP_MMHG: 9
OS_IOP_MMHG: 11

## 2024-07-12 ENCOUNTER — POST-OP (OUTPATIENT)
Dept: URBAN - METROPOLITAN AREA CLINIC 10 | Facility: CLINIC | Age: 74
End: 2024-07-12

## 2024-07-12 DIAGNOSIS — H27.131: ICD-10-CM

## 2024-07-12 DIAGNOSIS — H52.13: ICD-10-CM

## 2024-07-12 PROCEDURE — 99024 POSTOP FOLLOW-UP VISIT: CPT

## 2024-07-12 PROCEDURE — 92015 DETERMINE REFRACTIVE STATE: CPT | Mod: NC

## 2024-07-12 ASSESSMENT — TONOMETRY
OS_IOP_MMHG: 11
OD_IOP_MMHG: 18

## 2024-07-12 ASSESSMENT — VISUAL ACUITY
OS_CC: 20/40-2
OD_SC: 20/70
OD_CC: 20/100-1
OS_SC: 20/40
OS_PH: 20/30-2

## 2024-08-14 ENCOUNTER — POST-OP (OUTPATIENT)
Facility: LOCATION | Age: 74
End: 2024-08-14

## 2024-08-14 DIAGNOSIS — H43.813: ICD-10-CM

## 2024-08-14 DIAGNOSIS — H33.011: ICD-10-CM

## 2024-08-14 DIAGNOSIS — H27.131: ICD-10-CM

## 2024-08-14 PROCEDURE — 99024 POSTOP FOLLOW-UP VISIT: CPT

## 2024-08-14 PROCEDURE — 92134 CPTRZ OPH DX IMG PST SGM RTA: CPT

## 2024-08-14 ASSESSMENT — VISUAL ACUITY
OS_CC: 20/40-1
OD_CC: 20/70-1
OS_PH: 20/30-2

## 2024-08-14 ASSESSMENT — TONOMETRY
OD_IOP_MMHG: 14
OS_IOP_MMHG: 16

## 2025-04-28 ENCOUNTER — COMPREHENSIVE EXAM (OUTPATIENT)
Age: 75
End: 2025-04-28

## 2025-04-28 DIAGNOSIS — H52.13: ICD-10-CM

## 2025-04-28 DIAGNOSIS — H40.1121: ICD-10-CM

## 2025-04-28 DIAGNOSIS — H33.011: ICD-10-CM

## 2025-04-28 DIAGNOSIS — H40.1113: ICD-10-CM

## 2025-04-28 PROCEDURE — 92250 FUNDUS PHOTOGRAPHY W/I&R: CPT

## 2025-04-28 PROCEDURE — 92015 DETERMINE REFRACTIVE STATE: CPT

## 2025-04-28 PROCEDURE — 92014 COMPRE OPH EXAM EST PT 1/>: CPT

## 2025-08-20 ENCOUNTER — COMPREHENSIVE EXAM (OUTPATIENT)
Age: 75
End: 2025-08-20

## 2025-08-20 DIAGNOSIS — H40.1121: ICD-10-CM

## 2025-08-20 DIAGNOSIS — H33.011: ICD-10-CM

## 2025-08-20 DIAGNOSIS — H40.1113: ICD-10-CM

## 2025-08-20 DIAGNOSIS — H27.131: ICD-10-CM

## 2025-08-20 DIAGNOSIS — H43.813: ICD-10-CM

## 2025-08-20 PROCEDURE — 92134 CPTRZ OPH DX IMG PST SGM RTA: CPT

## 2025-08-20 PROCEDURE — 92201 OPSCPY EXTND RTA DRAW UNI/BI: CPT

## 2025-08-20 PROCEDURE — 99214 OFFICE O/P EST MOD 30 MIN: CPT
